# Patient Record
Sex: FEMALE | Race: WHITE | Employment: UNEMPLOYED | URBAN - METROPOLITAN AREA
[De-identification: names, ages, dates, MRNs, and addresses within clinical notes are randomized per-mention and may not be internally consistent; named-entity substitution may affect disease eponyms.]

---

## 2017-02-03 ENCOUNTER — APPOINTMENT (OUTPATIENT)
Dept: GENERAL RADIOLOGY | Age: 57
End: 2017-02-03
Attending: EMERGENCY MEDICINE
Payer: MEDICARE

## 2017-02-03 ENCOUNTER — HOSPITAL ENCOUNTER (EMERGENCY)
Age: 57
Discharge: HOME | End: 2017-02-03
Attending: EMERGENCY MEDICINE | Admitting: EMERGENCY MEDICINE
Payer: MEDICARE

## 2017-02-03 VITALS
SYSTOLIC BLOOD PRESSURE: 141 MMHG | WEIGHT: 130 LBS | DIASTOLIC BLOOD PRESSURE: 92 MMHG | TEMPERATURE: 98.1 F | RESPIRATION RATE: 16 BRPM | OXYGEN SATURATION: 98 % | BODY MASS INDEX: 23.04 KG/M2 | HEART RATE: 102 BPM | HEIGHT: 63 IN

## 2017-02-03 DIAGNOSIS — R09.81 SINUS CONGESTION: ICD-10-CM

## 2017-02-03 DIAGNOSIS — J20.9 ACUTE BRONCHITIS, UNSPECIFIED ORGANISM: Primary | ICD-10-CM

## 2017-02-03 PROCEDURE — 99282 EMERGENCY DEPT VISIT SF MDM: CPT

## 2017-02-03 PROCEDURE — 71020 XR CHEST PA LAT: CPT

## 2017-02-03 RX ORDER — AZITHROMYCIN 250 MG/1
TABLET, FILM COATED ORAL
Qty: 6 TAB | Refills: 0 | Status: SHIPPED | OUTPATIENT
Start: 2017-02-03 | End: 2017-02-08

## 2017-02-03 RX ORDER — CLINDAMYCIN PHOSPHATE 11.9 MG/ML
SOLUTION TOPICAL
Qty: 60 ML | Refills: 0 | Status: SHIPPED | OUTPATIENT
Start: 2017-02-03 | End: 2017-12-18

## 2017-02-03 RX ORDER — MONTELUKAST SODIUM 10 MG/1
10 TABLET ORAL DAILY
COMMUNITY

## 2017-02-03 RX ORDER — LEVALBUTEROL INHALATION SOLUTION 0.63 MG/3ML
0.63 SOLUTION RESPIRATORY (INHALATION)
COMMUNITY

## 2017-02-03 RX ORDER — PREDNISONE 20 MG/1
60 TABLET ORAL DAILY
Qty: 15 TAB | Refills: 0 | Status: SHIPPED | OUTPATIENT
Start: 2017-02-03 | End: 2017-02-08

## 2017-02-03 NOTE — ED TRIAGE NOTES
Pt c/o rash around nose and eye that started  3 weeks ago. Pt also c/o stuffed up right ear for 3 weeks. Pt states she has had a sinus infection for 3 weeks. Pt states 3 days ago she developed cough and fever.

## 2017-02-03 NOTE — DISCHARGE INSTRUCTIONS
Bronchitis: Care Instructions  Your Care Instructions    Bronchitis is inflammation of the bronchial tubes, which carry air to the lungs. The tubes swell and produce mucus, or phlegm. The mucus and inflamed bronchial tubes make you cough. You may have trouble breathing. Most cases of bronchitis are caused by viruses like those that cause colds. Antibiotics usually do not help and they may be harmful. Bronchitis usually develops rapidly and lasts about 2 to 3 weeks in otherwise healthy people. Follow-up care is a key part of your treatment and safety. Be sure to make and go to all appointments, and call your doctor if you are having problems. It's also a good idea to know your test results and keep a list of the medicines you take. How can you care for yourself at home? · Take all medicines exactly as prescribed. Call your doctor if you think you are having a problem with your medicine. · Get some extra rest.  · Take an over-the-counter pain medicine, such as acetaminophen (Tylenol), ibuprofen (Advil, Motrin), or naproxen (Aleve) to reduce fever and relieve body aches. Read and follow all instructions on the label. · Do not take two or more pain medicines at the same time unless the doctor told you to. Many pain medicines have acetaminophen, which is Tylenol. Too much acetaminophen (Tylenol) can be harmful. · Take an over-the-counter cough medicine that contains dextromethorphan to help quiet a dry, hacking cough so that you can sleep. Avoid cough medicines that have more than one active ingredient. Read and follow all instructions on the label. · Breathe moist air from a humidifier, hot shower, or sink filled with hot water. The heat and moisture will thin mucus so you can cough it out. · Do not smoke. Smoking can make bronchitis worse. If you need help quitting, talk to your doctor about stop-smoking programs and medicines. These can increase your chances of quitting for good.   When should you call for help? Call 911 anytime you think you may need emergency care. For example, call if:  · You have severe trouble breathing. Call your doctor now or seek immediate medical care if:  · You have new or worse trouble breathing. · You cough up dark brown or bloody mucus (sputum). · You have a new or higher fever. · You have a new rash. Watch closely for changes in your health, and be sure to contact your doctor if:  · You cough more deeply or more often, especially if you notice more mucus or a change in the color of your mucus. · You are not getting better as expected. Where can you learn more? Go to http://yaw-gaby.info/. Enter H333 in the search box to learn more about \"Bronchitis: Care Instructions. \"  Current as of: May 23, 2016  Content Version: 11.1  © 3439-7254 [employer]. Care instructions adapted under license by [employer] (which disclaims liability or warranty for this information). If you have questions about a medical condition or this instruction, always ask your healthcare professional. [address] any warranty or liability for your use of this information. Learning About COPD and How to Prevent Lung Infections  How do lung infections affect COPD? Lung infections like pneumonia and acute bronchitis are common causes of COPD flare-ups. And people who have COPD are more likely to get these lung infections, especially if they smoke. When you have COPD, it is important to know the symptoms of pneumonia and acute bronchitis and call your doctor if you have them. Symptoms include:  · A cough that brings up more mucus than usual.  · Fever. · Shortness of breath. What can you do to prevent these infections? Stay healthy  · Get a flu shot every year. · Get a pneumococcal vaccine shot. If you have had one before, ask your doctor whether you need another dose.  Two different types of pneumococcal vaccines are recommended for people ages 72 and older. · If you must be around people with colds or the flu, wash your hands often. · Do not smoke. This is the most important step you can take to prevent more damage to your lungs. If you need help quitting, talk to your doctor about stop-smoking programs and medicines. These can increase your chances of quitting for good. · Avoid secondhand smoke, air pollution, and high altitudes. Also avoid cold, dry air and hot, humid air. Stay at home with your windows closed when air pollution is bad. Exercise and eat well  · If your doctor recommends it, get more exercise. Walking is a good choice. Bit by bit, increase the amount you walk every day. Try for at least 30 minutes on most days of the week. · Eat regular, well-balanced meals. Eating right keeps your energy levels up and helps your body fight infection. · Get plenty of rest and sleep. Follow-up care is a key part of your treatment and safety. Be sure to make and go to all appointments, and call your doctor if you are having problems. It's also a good idea to know your test results and keep a list of the medicines you take. Where can you learn more? Go to http://yaw-gaby.info/. Enter Q636 in the search box to learn more about \"Learning About COPD and How to Prevent Lung Infections. \"  Current as of: May 23, 2016  Content Version: 11.1  © 5325-4421 [employer]. Care instructions adapted under license by [employer] (which disclaims liability or warranty for this information). If you have questions about a medical condition or this instruction, always ask your healthcare professional. [place] [address] any warranty or liability for your use of this information. Bronchitis: Care Instructions  Your Care Instructions    Bronchitis is inflammation of the bronchial tubes, which carry air to the lungs. The tubes swell and produce mucus, or phlegm.  The mucus and inflamed bronchial tubes make you cough. You may have trouble breathing. Most cases of bronchitis are caused by viruses like those that cause colds. Antibiotics usually do not help and they may be harmful. Bronchitis usually develops rapidly and lasts about 2 to 3 weeks in otherwise healthy people. Follow-up care is a key part of your treatment and safety. Be sure to make and go to all appointments, and call your doctor if you are having problems. It's also a good idea to know your test results and keep a list of the medicines you take. How can you care for yourself at home? · Take all medicines exactly as prescribed. Call your doctor if you think you are having a problem with your medicine. · Get some extra rest.  · Take an over-the-counter pain medicine, such as acetaminophen (Tylenol), ibuprofen (Advil, Motrin), or naproxen (Aleve) to reduce fever and relieve body aches. Read and follow all instructions on the label. · Do not take two or more pain medicines at the same time unless the doctor told you to. Many pain medicines have acetaminophen, which is Tylenol. Too much acetaminophen (Tylenol) can be harmful. · Take an over-the-counter cough medicine that contains dextromethorphan to help quiet a dry, hacking cough so that you can sleep. Avoid cough medicines that have more than one active ingredient. Read and follow all instructions on the label. · Breathe moist air from a humidifier, hot shower, or sink filled with hot water. The heat and moisture will thin mucus so you can cough it out. · Do not smoke. Smoking can make bronchitis worse. If you need help quitting, talk to your doctor about stop-smoking programs and medicines. These can increase your chances of quitting for good. When should you call for help? Call 911 anytime you think you may need emergency care. For example, call if:  · You have severe trouble breathing.   Call your doctor now or seek immediate medical care if:  · You have new or worse trouble breathing. · You cough up dark brown or bloody mucus (sputum). · You have a new or higher fever. · You have a new rash. Watch closely for changes in your health, and be sure to contact your doctor if:  · You cough more deeply or more often, especially if you notice more mucus or a change in the color of your mucus. · You are not getting better as expected. Where can you learn more? Go to http://yaw-gaby.info/. Enter H333 in the search box to learn more about \"Bronchitis: Care Instructions. \"  Current as of: May 23, 2016  Content Version: 11.1  © 4877-4921 [employer]. Care instructions adapted under license by [employer] (which disclaims liability or warranty for this information). If you have questions about a medical condition or this instruction, always ask your healthcare professional. [place] [address] any warranty or liability for your use of this information. Saline Nasal Washes: Care Instructions  Your Care Instructions  Saline nasal washes help keep the nasal passages open by washing out thick or dried mucus. This simple remedy can help relieve symptoms of allergies, sinusitis, and colds. It also can make the nose feel more comfortable by keeping the mucous membranes moist. You may notice a little burning sensation in your nose the first few times you use the solution, but this usually gets better in a few days. Follow-up care is a key part of your treatment and safety. Be sure to make and go to all appointments, and call your doctor if you are having problems. It's also a good idea to know your test results and keep a list of the medicines you take. How can you care for yourself at home? · You can buy premixed saline solution in a squeeze bottle or other sinus rinse products at a drugstore. Read and follow the instructions on the label.   · You also can make your own saline solution by adding 1 teaspoon of salt and 1 teaspoon of baking soda to 2 cups of distilled water. · If you use a homemade solution, pour a small amount into a clean bowl. Using a rubber bulb syringe, squeeze the syringe and place the tip in the salt water. Pull a small amount of the salt water into the syringe by relaxing your hand. · Sit down with your head tilted slightly back. Do not lie down. Put the tip of the bulb syringe or the squeeze bottle a little way into one of your nostrils. Gently drip or squirt a few drops into the nostril. Repeat with the other nostril. Some sneezing and gagging are normal at first.  · Gently blow your nose. · Wipe the syringe or bottle tip clean after each use. · Repeat this 2 or 3 times a day. · Use nasal washes gently if you have nosebleeds often. When should you call for help? Watch closely for changes in your health, and be sure to contact your doctor if:  · You often get nosebleeds. · You have problems doing the nasal washes. Where can you learn more? Go to http://yaw-gaby.info/. Enter [id number] [id number] in the search box to learn more about \"Saline Nasal Washes: Care Instructions. \"  Current as of: July 29, 2016  Content Version: 11.1  © 9792-3171 [employer]. Care instructions adapted under license by [employer] (which disclaims liability or warranty for this information). If you have questions about a medical condition or this instruction, always ask your healthcare professional. [place] [address] any warranty or liability for your use of this information.

## 2017-02-03 NOTE — ED PROVIDER NOTES
HPI Comments:   12:31 PM   Karli Muniz is a 55 y.o. female presenting to the ED C/O ear pain onset 1 month. Associated sinus pressure x 2-3 weeks, facial rash around the nose and mouth 2-3 days ago, cough, low grade fever since yesterday, nausea, dysphagia, sinus tenderness, . Pt denies the use of any new creams aside from using Neosporin. Pt has a PMHx of ephysema and COPD (pt has a nebulizer machine she uses) as well as depression and ADD. Pt is a former smoker that quit 2-3 years ago and denies EtOH use. Allergy reported to Sulfasalazine. Pt denies vomiting, appetite change, dysuria, hematuria, urinary retention, and any other associated signs and sx. Patient is a 55 y.o. female presenting with ear pain. The history is provided by the patient. No  was used. Ear Pain   This is a new problem. The current episode started more than 1 week ago. The problem occurs constantly. The problem has not changed since onset. Past Medical History:   Diagnosis Date    ADD (attention deficit disorder with hyperactivity)     COPD     Depression        Past Surgical History:   Procedure Laterality Date    Hx appendectomy           History reviewed. No pertinent family history. Social History     Social History    Marital status:      Spouse name: N/A    Number of children: N/A    Years of education: N/A     Occupational History    Not on file. Social History Main Topics    Smoking status: Former Smoker     Packs/day: 0.50    Smokeless tobacco: Not on file    Alcohol use No    Drug use: Not on file    Sexual activity: Not on file     Other Topics Concern    Not on file     Social History Narrative         ALLERGIES: Sulfasalazine    Review of Systems   Constitutional: Negative for appetite change. HENT: Positive for ear pain. Respiratory: Positive for cough. Gastrointestinal: Negative for vomiting.    Genitourinary: Negative for difficulty urinating, dysuria and hematuria. All other systems reviewed and are negative. Vitals:    02/03/17 1203   BP: (!) 141/92   Pulse: (!) 102   Resp: 16   Temp: 98.1 °F (36.7 °C)   SpO2: 98%   Weight: 59 kg (130 lb)   Height: 5' 3\" (1.6 m)            Physical Exam   Nursing note and vitals reviewed. -------------------------PHYSICAL EXAM-------------------------    Vital signs and nursing notes reviewed  Nursing note and VS were reviewed      CONSTITUTIONAL: Well developed, well nourished and appears adequately hydrated. NAD. Awake and alert. Non-toxic in appearance, not diaphoretic. Afebrile. HEAD: Normocephalic, Atraumatic. The face has areas of irritated skin in the nasolabial folds with small red papules in the inferior aspect of the left eye. EYES: Pupils are equal, round and reactive. Extra-ocular movements intact. Sclera anicteric. Conjunctiva not injected. ENT: Mucous membranes are moist. TMs are dull. No erythema. The nose is congested and red. Throat has diffuse erythema with no exudate. No oral lesions or thrush. NECK: Normal ROM. Neck is supple. No posterior cervical paraspinal or midline tenderness. No obvious enlargement of the thyroid. No significant anterior cervical adenopathy. Trachea is midline. CARDIOVASCULAR:  regular rhythm. No murmurs, rubs or gallops. Distal pulses are 2+ and equal.    PULMONARY: Respiratory effort is normal and without the use of accessory muscles. Patient is speaking in full sentences. The lungs have occasional wheezing and rhonchi on the right. Good air movements. CHEST WALL: Normal shape;  non tender to palpation; no crepitus    ABDOMINAL: Soft and non-distended. No tenderness. NO peritoneal signs - No rebound, guarding or rigidity. Active bowel sounds present. BACK: No thoracolumbar midline or paraspinal tenderness. Full range of motion. No CVA tenderness. MUSCULOSKELETAL: No obvious soft tissue tenderness or sites of bony tenderness or deformities;  Full range of motion in all extremities. No obvious muscle tenderness. No joint inflammation. No peripheral edema. SKIN: Skin is warm and dry. Good skin turgor. No diaphoresis, lesions,  Rashes, or petechiae. Cap Refill is Normal.    NEUROLOGICAL: Alert, awake and appropriately oriented. Normal speech. CN's are normal;  Motor - no focal weakness; no obvious sensory loss; Cerebellar function- intact; DTR's - 2+ equal    PSYCH: Appropriate affect; normal thought content; no expressed suicidal ideation. MDM  Number of Diagnoses or Management Options  Acute bronchitis, unspecified organism:   Sinus congestion:   Diagnosis management comments: INITIAL CLINICAL IMPRESSION and PLANS:  The patient presents with the primary complaint(s) of: ear pain and cough. The presentation, to include historical aspects and clinical findings appear to be consistent with the DX of bronchitis. However, other possible DX's to consider as primary, associated with, or exacerbated by include:    1.  URI  2. Serous Otitis  3. Pharyngitis    Considering the above, my initial management plan to evaluate and therapeutic interventions include: Obtain Radiologic studies,  As well as those noted in the orders:    Mckenna Blue MD        Amount and/or Complexity of Data Reviewed  Tests in the radiology section of CPT®: ordered and reviewed (CXR)  Independent visualization of images, tracings, or specimens: yes (CXR)      ED Course       Procedures    FINAL CLINICAL IMPRESSION AND DISPOSITION DECISION  - DISCHARGE:  1:29 PM  I reviewed our electronic medical record system for any past medical records that were available that may contribute to the patients current condition, the nursing notes and vital signs from today's visit. Based on the clinical presentation and findings the clinical impression noted below is straight forward.        Studies indicated and / or done during this ED encounter:   NONE INDICATED AT THIS TIME    RESULTS: XR CHEST PA LAT   Final Result   IMPRESSION:     1. No acute cardiopulmonary process. As read by the radiologist.      Labs Reviewed - No data to display   No results found for this or any previous visit (from the past 12 hour(s)). Intervention(s) while in ED: NONE INDICATED AT THIS TIME    MEDICATIONS GIVEN: Medications - No data to display    Present condition:  STABLE    Planned Treatment Management Upon Discharge: SYMPTOMATIC TREATMENT    SPECIFIC CONVERSATIONS:     I feel that we have optimized outpatient assessment and management such that Wilber Martino is stable to be discharged and to continue with her care or complete any additional evaluation as appropriate at home or as an outpatient. DISCHARGE NOTE:   Logan Tellez's  results have been reviewed with the parents / guardian. They have been counseled regarding her  diagnosis. The parent / guardian verbally conveys understanding and agreement of the signs, symptoms, diagnosis, treatment and prognosis and additionally agrees to follow up as recommended with None  in 24 - 48 hours. The parent / guardian  also agrees with the care-plan and conveys that all of their questions have been answered. I have also put together some discharge instructions for her that include: 1) educational information regarding their diagnosis, 2) how to care for the diagnosis at home, as well a 3) list of reasons why they would want to return to the ED prior to their follow-up appointment, should the child's condition change. The patient and/or family has been provided with education for proper Emergency Department utilization. CLINICAL IMPRESSION  1. Acute bronchitis, unspecified organism    2. Sinus congestion          PLAN:  1. D/C home  2. Discharge Medication List as of 2/3/2017  1:29 PM      START taking these medications    Details   ! ! predniSONE (DELTASONE) 20 mg tablet Take 3 Tabs by mouth daily for 5 days.  With Breakfast, Print, Disp-15 Tab, R-0 !! azithromycin (ZITHROMAX Z-MAX) 250 mg tablet Take 2 tabs now and then 1 tab each day until gone, Print, Disp-6 Tab, R-0      !! azithromycin (ZITHROMAX Z-MAX) 250 mg tablet Take 2 tabs now and then 1 tab each day until gone, Normal, Disp-6 Tab, R-0      !! predniSONE (DELTASONE) 20 mg tablet Take 3 Tabs by mouth daily for 5 days. , Normal, Disp-15 Tab, R-0      clindamycin (CLEOCIN T) 1 % external solution use thin film on affected area, Normal, Disp-60 mL, R-0       !! - Potential duplicate medications found. Please discuss with provider. CONTINUE these medications which have NOT CHANGED    Details   montelukast (SINGULAIR) 10 mg tablet Take 10 mg by mouth daily. , Historical Med      levalbuterol (XOPENEX) 0.63 mg/3 mL nebu 0.63 mg by Nebulization route every four (4) hours as needed., Historical Med      fluticasone-salmeterol (ADVAIR DISKUS) 250-50 mcg/dose diskus inhaler Not for PRN use  Not for use as a rescue inhaler. Take 1 Puff by inhalation every twelve (12) hours. Historical Med, 1 Puff      tiotropium (SPIRIVA WITH HANDIHALER) 18 mcg inhalation capsule Not intended for PRN useTake 1 Cap by inhalation daily. Historical Med, 1 Cap      AMPHET ASP/AMPHET/D-AMPHET (ADDERALL PO) Take  by mouth. Historical Med           3. Follow-up Information     Follow up With Details Comments Contact Info    [facility]   [id number] [place] [address], [address] [place] [address]  [phone]        Return if sxs worsen    ATTESTATIONS:  This note is prepared by Sawyer Melchor, acting as Scribe for Muriel Sharp MD.     Muriel Sharp MD: The scribe's documentation has been prepared under my direction and personally reviewed by me in its entirety. I confirm that the note above accurately reflects all work, treatment, procedures, and medical decision making performed by me.

## 2017-02-03 NOTE — Clinical Note
SPECIAL DISCHARGE INSTRUCTIONS AND COMMENTS: 
 
General comments: Thank you for allowing us to provide you with excellent care today. We hope we addressed all of your concerns and needs. We strive to provide excellent quality care in the Emergency Depart ment. If you feel that you have not received excellent quality care or timely care, please ask to speak to the nurse manager. Please choose us in the future for your continued health care needs. Follow-up comments: The exam and treatment you rece ived in the Emergency Department were for an urgent problem that may be new for you and / or one which may be related to a worsening of a chronic or ongoing medical problem that you already had prior to this visit. In any case, today's treatment is not i ntended to be considered as complete care in all cases and thus, it is important that you follow-up with a doctor, nurse practitioner, or physicians assistant to: (1) confirm your diagnosis, (2) re-evaluation of changes in your illness and treatment, an d (3) for ongoing care. In some cases we may have contacted your doctor or a specific specialist who may be involved in your future evaluation and care but in any case, take this sheet with you when you go to your follow-up visit or refer to the infor keanu on these sheets when you are calling to arrange an appointment - it may prove helpful in making the appointment. Prescribed Medications: Unless you have been directed by the provider to change your current medicines, you should continue to mayra e them as before. If you have been prescribed medicines, please take them as directed. In some cases, these new medicines are intended to replace a medicine that you are currently taking and if so, this will be noted below.  
 
 Our expectation is that y our condition will improve by following the doctor's recommendations, however, if in the event your condition worsens or does not improve as expected, please follow-up with your PCP or if unable to reach your usual health care provider, you should return  to the Emergency Department. We are available 24 hours a day. SPECIFIC INSTRUCTIONS PROVIDED BY YOUR DOCTOR, Ramone Diane MD:  
THINGS TO CONSIDER TO HELP TREAT THE NASAL CONGESTION: 
 
The condition that you present with today can be treated  well with several over the counter medicines in addition to any medicine that you may have also been prescribed. For example: for the congestion, use Sudafed (caution if you have high blood pressure) and Mucinex (brand or generic); there is also sev eral medicines that will dry the nose secretions that are not so sedating: Claritin, Allegra, and Zrytec. Lastly ons of the most effective ways of controlling or lessening the nasal secretion is by rinsing out the nose with saline water (distilled wa ter with salt - either commercial packets or 1/4 tsp) via the use of a Za pot type of nasal irrigation set or a squeeze bottle intended for that purpose. This works wonders to improve congestion and cough. For extreme nasal stuffiness, a puff or  two of Afrin will open the nostrils, however, use of this medicine should be limited to twice a day and for no more than for 3 days. If you have been prescribed medicines they are intended to control the inflammation involving your nose and throat wh [place]  they are intended to decrease the discomfort in your throat and to lessen the coughing  so that you can rest. Please be aware: any cough medicine is NOT intended to completely stop the coughing, which is a protective mechanism to prevent the develop ment of pneumonia.

## 2017-12-18 ENCOUNTER — HOSPITAL ENCOUNTER (EMERGENCY)
Age: 57
Discharge: HOME | End: 2017-12-18
Attending: EMERGENCY MEDICINE
Payer: MEDICARE

## 2017-12-18 ENCOUNTER — APPOINTMENT (OUTPATIENT)
Dept: GENERAL RADIOLOGY | Age: 57
End: 2017-12-18
Attending: PHYSICIAN ASSISTANT
Payer: MEDICARE

## 2017-12-18 VITALS
RESPIRATION RATE: 18 BRPM | HEART RATE: 84 BPM | OXYGEN SATURATION: 98 % | TEMPERATURE: 97.6 F | DIASTOLIC BLOOD PRESSURE: 86 MMHG | HEIGHT: 63 IN | BODY MASS INDEX: 23.92 KG/M2 | SYSTOLIC BLOOD PRESSURE: 137 MMHG | WEIGHT: 135 LBS
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
## 2017-12-18 DIAGNOSIS — S40.021A ARM CONTUSION, RIGHT, INITIAL ENCOUNTER: ICD-10-CM

## 2017-12-18 DIAGNOSIS — S20.211A RIB CONTUSION, RIGHT, INITIAL ENCOUNTER: Primary | ICD-10-CM

## 2017-12-18 PROCEDURE — 73060 X-RAY EXAM OF HUMERUS: CPT

## 2017-12-18 PROCEDURE — 71101 X-RAY EXAM UNILAT RIBS/CHEST: CPT

## 2017-12-18 PROCEDURE — 99283 EMERGENCY DEPT VISIT LOW MDM: CPT

## 2017-12-18 RX ORDER — HYDROCODONE BITARTRATE AND ACETAMINOPHEN 5; 325 MG/1; MG/1
1-2 TABLET ORAL
Qty: 16 TAB | Refills: 0 | Status: SHIPPED | OUTPATIENT
Start: 2017-12-18

## 2017-12-18 RX ORDER — LAMOTRIGINE 100 MG/1
TABLET ORAL DAILY
COMMUNITY

## 2017-12-18 NOTE — ED TRIAGE NOTES
Patient states that she fell over a small wooden fence on Wednesday and landed face down. Patient states that the wind was knocked out of her and she is having right rib, side arm and back pain. Sepsis Screening completed    (  )Patient meets SIRS criteria. ( x )Patient does not meet SIRS criteria.       SIRS Criteria is achieved when two or more of the following are present   Temperature < 96.8°F (36°C) or > 100.9°F (38.3°C)   Heart Rate > 90 beats per minute   Respiratory Rate > 20 breaths per minute   WBC count > 12,000 or <4,000 or > 10% bands

## 2017-12-18 NOTE — DISCHARGE INSTRUCTIONS
Chest Contusion: Care Instructions  Your Care Instructions  A chest contusion, or bruise, is caused by a fall or direct blow to the chest. Car crashes, falls, getting punched, and injury from bicycle handlebars are common causes of chest contusions. A very forceful blow to the chest can injure the heart or blood vessels in the chest, the lungs, the airway, the liver, or the spleen. Pain may be caused by an injury to muscles, cartilage, or ribs. Deep breathing, coughing, or sneezing can increase your pain. Lying on the injured area also can cause pain. Follow-up care is a key part of your treatment and safety. Be sure to make and go to all appointments, and call your doctor if you are having problems. It's also a good idea to know your test results and keep a list of the medicines you take. How can you care for yourself at home? · Rest and protect the injured or sore area. Stop, change, or take a break from any activity that may be causing your pain. · Put ice or a cold pack on the area for 10 to 20 minutes at a time. Put a thin cloth between the ice and your skin. · After 2 or 3 days, if your swelling is gone, apply a heating pad set on low or a warm cloth to your chest. Some doctors suggest that you go back and forth between hot and cold. Put a thin cloth between the heating pad and your skin. · Do not wrap or tape your ribs for support. This may cause you to take smaller breaths, which could increase your risk of pneumonia and lung collapse. · Ask your doctor if you can take an over-the-counter pain medicine, such as acetaminophen (Tylenol), ibuprofen (Advil, Motrin), or naproxen (Aleve). Be safe with medicines. Read and follow all instructions on the label. · Take your medicines exactly as prescribed. Call your doctor if you think you are having a problem with your medicine.   · Gentle stretching and massage may help you feel better after a few days of rest. Stretch slowly to the point just before discomfort begins, then hold the stretch for at least 15 to 30 seconds. Do this 3 or 4 times per day. · As your pain gets better, slowly return to your normal activities. Be patient, because chest bruises can take weeks or months to heal. Any increased pain may be a sign that you need to rest a while longer. When should you call for help? Call 911 anytime you think you may need emergency care. For example, call if:  ? · You have severe trouble breathing. ? · You cough up blood. ?Call your doctor now or seek immediate medical care if:  ? · You have belly pain. ? · You are dizzy or lightheaded, or you feel like you may faint. ? · You develop new symptoms with the chest pain. ? · Your chest pain gets worse. ? · You have a fever. ? · You have some shortness of breath. ? · You have a cough that brings up mucus from the lungs. ? Watch closely for changes in your health, and be sure to contact your doctor if:  ? · Your chest pain is not improving after 1 week. Where can you learn more? Go to http://yaw-gaby.info/. Enter I174 in the search box to learn more about \"Chest Contusion: Care Instructions. \"  Current as of: March 20, 2017  Content Version: 11.4  © 3807-2048 [employer]. Care instructions adapted under license by [employer] (which disclaims liability or warranty for this information). If you have questions about a medical condition or this instruction, always ask your healthcare professional. [place] [address] any warranty or liability for your use of this information.

## 2017-12-18 NOTE — ED PROVIDER NOTES
EMERGENCY DEPARTMENT HISTORY AND PHYSICAL EXAM    Date: 12/18/2017  Patient Name: Reyna Steward    History of Presenting Illness     Chief Complaint   Patient presents with    Rib Pain    Fall         History Provided By: Patient    Chief Complaint: Rib/chest pain  Duration: 5 Days  Timing:  Constant and Worsening  Location: Right-sided   Severity: 8 out of 10  Modifying Factors: Worse with movement   Associated Symptoms: Right upper arm pain    Additional History (Context):   11:56 AM  Reyna Steward is a 65 y.o. female who presents to the emergency department C/O constant, worsening right-sided rib/chest pain S/P a fall that occurred 5 days ago. Pain rated 8/10. Associated sxs include right upper arm pain. Pt reports her foot got caught in a fence causing her to fall onto her right side. Pt states the pain is worse with movement. Pt reports she has taken Ibuprofen with no relief. Pt has hx of lung excision at [facility] last year for suspected malignancy which was negative per patient. Pt with allergy to Sulfasalazine. Pt denies any other sxs or complaints. PCP: Samanta Oconnell MD    Current Outpatient Prescriptions   Medication Sig Dispense Refill    lamoTRIgine (LAMICTAL) 100 mg tablet Take  by mouth daily.  HYDROcodone-acetaminophen (NORCO) 5-325 mg per tablet Take 1-2 Tabs by mouth every four (4) hours as needed for Pain. Max Daily Amount: 12 Tabs. 16 Tab 0    montelukast (SINGULAIR) 10 mg tablet Take 10 mg by mouth daily.  levalbuterol (XOPENEX) 0.63 mg/3 mL nebu 0.63 mg by Nebulization route every four (4) hours as needed.  fluticasone-salmeterol (ADVAIR DISKUS) 250-50 mcg/dose diskus inhaler Take 1 Puff by inhalation every twelve (12) hours.  tiotropium (SPIRIVA WITH HANDIHALER) 18 mcg inhalation capsule Take 1 Cap by inhalation daily.  AMPHET ASP/AMPHET/D-AMPHET (ADDERALL PO) Take  by mouth.            Past History     Past Medical History:  Past Medical History: Diagnosis Date    ADD (attention deficit disorder with hyperactivity)     COPD     Depression        Past Surgical History:  Past Surgical History:   Procedure Laterality Date    HX APPENDECTOMY         Family History:  History reviewed. No pertinent family history. Social History:  Social History   Substance Use Topics    Smoking status: Former Smoker     Packs/day: 0.50    Smokeless tobacco: Never Used    Alcohol use No       Allergies: Allergies   Allergen Reactions    Sulfasalazine Hives       Review of Systems   Review of Systems   Constitutional: Negative for chills and fever. Respiratory: Negative for cough and shortness of breath. Cardiovascular: Negative for chest pain. Genitourinary: Negative for dysuria and flank pain. Musculoskeletal: Positive for arthralgias (R upper arm) and myalgias. Negative for joint swelling.        (+) Right-sided rib/chest pain  (+) Right upper arm pain   Skin: Negative for color change. Neurological: Negative for dizziness and headaches. Hematological: Negative for adenopathy. Physical Exam     Vitals:    12/18/17 1110 12/18/17 1219   BP: 148/86 137/86   Pulse: (!) 106 84   Resp: 20 18   Temp: 97.6 °F (36.4 °C)    SpO2: 100% 98%   Weight: 61.2 kg (135 lb)    Height: 5' 3\" (1.6 m)      Physical Exam   Constitutional: She is oriented to person, place, and time. She appears well-developed and well-nourished. No distress.  female in NAD. Alert. Appears comfortable. SO at bedside. HENT:   Head: Normocephalic and atraumatic. Right Ear: External ear normal.   Left Ear: External ear normal.   Nose: Nose normal.   Eyes: Conjunctivae are normal.   Neck: Normal range of motion. Neck supple. No spinous process tenderness and no muscular tenderness present. Normal range of motion present. Cardiovascular: Normal rate, regular rhythm, normal heart sounds and intact distal pulses. Exam reveals no gallop and no friction rub.     No murmur heard.  Pulses:       Dorsalis pedis pulses are 2+ on the right side, and 2+ on the left side. Posterior tibial pulses are 2+ on the right side, and 2+ on the left side. Pulmonary/Chest: Effort normal and breath sounds normal. No accessory muscle usage. No tachypnea. No respiratory distress. She has no decreased breath sounds. She has no wheezes. She has no rhonchi. She has no rales. She exhibits tenderness. She exhibits no swelling. Abdominal: Soft. Musculoskeletal: Normal range of motion. Right shoulder: She exhibits no tenderness and no pain. Right elbow: She exhibits normal range of motion, no swelling and no effusion. No tenderness found. Right wrist: She exhibits normal range of motion, no tenderness, no bony tenderness and no swelling. Cervical back: She exhibits normal range of motion, no tenderness, no bony tenderness, no pain and no spasm. Thoracic back: She exhibits normal range of motion, no tenderness, no bony tenderness, no pain and no spasm. Lumbar back: She exhibits normal range of motion, no tenderness, no bony tenderness, no pain and no spasm. Right upper arm: She exhibits tenderness. She exhibits no swelling and no deformity. Arms:  Lymphadenopathy:     She has no cervical adenopathy. Neurological: She is alert and oriented to person, place, and time. Skin: Skin is warm and dry. No rash noted. She is not diaphoretic. No erythema. Psychiatric: She has a normal mood and affect. Judgment normal.   Nursing note and vitals reviewed. Diagnostic Study Results     Labs -   No results found for this or any previous visit (from the past 12 hour(s)). Radiologic Studies -   XR RIBS RT W PA CXR MIN 3 V   Final Result:  1. Postoperative changes involving the right lung apex as above, without  superimposed acute radiographic cardiopulmonary abnormality. 2. No evidence of displaced right-sided rib fracture.   As read by the radiologist.       XR HUMERUS RT   Final Result:  1. No acute radiographic abnormality involving the right humerus. 2. Postop changes involving the right lung apex. As read by the radiologist.         CT Results  (Last 48 hours)    None        CXR Results  (Last 48 hours)    None          Medications given in the ED-  Medications - No data to display      Medical Decision Making   I am the first provider for this patient. I reviewed the vital signs, available nursing notes, past medical history, past surgical history, family history and social history. Vital Signs-Reviewed the patient's vital signs. Pulse Oximetry Analysis - 100% on room air      Records Reviewed: Nursing Notes    Provider Notes (Medical Decision Making): fx, sprain, strain, contusion    Procedures:  Procedures    ED Course:   11:56 AM   Initial assessment performed. The patients presenting problems have been discussed, and they are in agreement with the care plan formulated and outlined with them. I have encouraged them to ask questions as they arise throughout their visit. Diagnosis and Disposition     Imaging unremarkable. No PTX. Lungs CTAB. Will tx sxs. Reasons to RTED discussed with pt. All questions answered. Pt feels comfortable going home at this time. Pt expressed understanding and she agrees with plan. DISCHARGE NOTE:  12:16 PM  Kelvin Tellez's  results have been reviewed with her. She has been counseled regarding her diagnosis, treatment, and plan. She verbally conveys understanding and agreement of the signs, symptoms, diagnosis, treatment and prognosis and additionally agrees to follow up as discussed. She also agrees with the care-plan and conveys that all of her questions have been answered.   I have also provided discharge instructions for her that include: educational information regarding their diagnosis and treatment, and list of reasons why they would want to return to the ED prior to their follow-up appointment, should her condition change. She has been provided with education for proper emergency department utilization. CLINICAL IMPRESSION:    1. Rib contusion, right, initial encounter    2. Arm contusion, right, initial encounter    3. Fall from slip, trip, or stumble, initial encounter        PLAN:  1. D/C Home  2. Discharge Medication List as of 12/18/2017 12:16 PM      START taking these medications    Details   HYDROcodone-acetaminophen (NORCO) 5-325 mg per tablet Take 1-2 Tabs by mouth every four (4) hours as needed for Pain. Max Daily Amount: 12 Tabs., Print, Disp-16 Tab, R-0         CONTINUE these medications which have NOT CHANGED    Details   lamoTRIgine (LAMICTAL) 100 mg tablet Take  by mouth daily. , Historical Med      montelukast (SINGULAIR) 10 mg tablet Take 10 mg by mouth daily. , Historical Med      levalbuterol (XOPENEX) 0.63 mg/3 mL nebu 0.63 mg by Nebulization route every four (4) hours as needed., Historical Med      fluticasone-salmeterol (ADVAIR DISKUS) 250-50 mcg/dose diskus inhaler Not for PRN use  Not for use as a rescue inhaler. Take 1 Puff by inhalation every twelve (12) hours. Historical Med, 1 Puff      tiotropium (SPIRIVA WITH HANDIHALER) 18 mcg inhalation capsule Not intended for PRN useTake 1 Cap by inhalation daily. Historical Med, 1 Cap      AMPHET ASP/AMPHET/D-AMPHET (ADDERALL PO) Take  by mouth. Historical Med           3. Follow-up Information     Follow up With Details Comments [id number] Rebekah Dewitt MD Schedule an appointment as soon as possible for a visit in 2 days For primary care follow up [address]  [phone]      [facility] EMERGENCY DEPT  As needed, If symptoms worsen [address] [place] [id number] [phone]        _______________________________    Attestations: This note is prepared by Maria T Garcia, acting as Scribe for Linh Hogue PA-C.     Linh Hogue PA-C:  The scribe's documentation has been prepared under my direction and personally reviewed by me in its entirety.   I confirm that the note above accurately reflects all work, treatment, procedures, and medical decision making performed by me.  _______________________________